# Patient Record
Sex: FEMALE | Race: BLACK OR AFRICAN AMERICAN
[De-identification: names, ages, dates, MRNs, and addresses within clinical notes are randomized per-mention and may not be internally consistent; named-entity substitution may affect disease eponyms.]

---

## 2017-08-21 NOTE — RAD
INDICATION:  Right elbow injury.



TECHNIQUE: 2 views of the right elbow were obtained.



FINDINGS:  The bones are in normal alignment. No joint effusion or fracture is seen. Joint

spaces appear maintained.



There is calcification adjacent to the medial epicondyle suggestive of calcific

tendinitis.



IMPRESSION:   NO EVIDENCE FOR FRACTURE.

## 2017-08-21 NOTE — RAD
INDICATION:  Right shoulder injury.



TECHNIQUE: 4 views of the right shoulder were obtained.



FINDINGS:  The bones are in normal alignment. No fracture is seen.  There is calcification

adjacent to the anterior lateral aspect of the humeral head suggestive of calcific

tendinitis. Joint spaces appear maintained.



IMPRESSION:  NO EVIDENCE OF FRACTURE.

## 2017-08-21 NOTE — UC
Upper Extremity HPI





- HPI Summary


HPI Summary: 





This is a 53 yo female with a h/o DM, HTN and anxiety who presented with c/o R 

elbow and shoulder pain.  She was involved in an event at work ~5 days ago 

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  She did not 

experience any immediate pain, but noticed increased pain and stiffness over 

the last several days.  She has trialed APAP, ice and immobilization at home 

without relief.  No weakness or numbness.





- History of Current Complaint


Chief Complaint: UCUpperExtremity


Stated Complaint: RIGHT ARM PAIN/LIMITED ROM


Hx Last Menstrual Period: JULY 14, 2017





- Allergies/Home Medications


Allergies/Adverse Reactions: 


 Allergies











Allergy/AdvReac Type Severity Reaction Status Date / Time


 


No Known Allergies Allergy   Verified 08/21/17 19:56











Home Medications: 


 Home Medications





Acetaminophen [Acetaminophen Extra Stren] 1,000 mg PO Q6H PRN 08/21/17 [History 

Confirmed 08/21/17]


Sitagliptin Phosphate [Januvia] 25 mg PO DAILY 08/21/17 [History Confirmed 08/21 /17]











PMH/Surg Hx/FS Hx/Imm Hx


Endocrine History: Diabetes


Cardiovascular History: Hypertension


Psychological History: Anxiety





- Surgical History


Surgical History: Yes


Surgery Procedure, Year, and Place: Lumpectomy Right Breast; Tubal Ligation





- Family History


Known Family History: Positive: Blood Disorder - sister had DVT





- Social History


Alcohol Use: Rare


Substance Use Type: None


Smoking Status (MU): Light Every Day Tobacco Smoker


Type: Cigarettes


Amount Used/How Often: 1/2 pack per day


Length of Time of Smoking/Using Tobacco: 25 YRS


Have You Smoked in the Last Year: Yes





Review of Systems


Constitutional: Negative


Skin: Negative


Eyes: Negative


ENT: Negative


Respiratory: Negative


Cardiovascular: Negative


Gastrointestinal: Negative


Genitourinary: Negative


Motor: Negative


Neurovascular: Negative


Musculoskeletal: Arthralgia, Decreased ROM


Neurological: Negative


Psychological: Negative


All Other Systems Reviewed And Are Negative: Yes





Physical Exam


Triage Information Reviewed: Yes


Appearance: Pain Distress - mild


Vital Signs: 


 Initial Vital Signs











Temp  98.1 F   08/21/17 19:48


 


Pulse  90   08/21/17 19:48


 


Resp  18   08/21/17 19:48


 


BP  125/64   08/21/17 19:48


 


Pulse Ox  97   08/21/17 19:48











Vital Signs Reviewed: Yes


Neck: Positive: Nontender, No Lymphadenopathy


Respiratory: Positive: Chest non-tender, Normal breath sounds.  Negative: 

Crackles, Rhonchi, Wheezing


Cardiovascular: Positive: RRR, No Murmur


Musculoskeletal: Positive: Strength Intact, ROM Limited @ - R shoulder, limited 

by pain, Other: - TTP over anterior humeral head and mild medial epicondyle TTP


Skin Exam: Normal


Skin: Negative: rashes





Diagnostics





- Laboratory


Diagnostic Studies Completed/Ordered: XR R elbow - no fx.  XR R shoulder - no fx





Upper Extremity Course/Dx





- Course


Course Of Treatment: This is a 53 yo female with DM and HTN who presents with R 

shoulder and elbow pain after an injury 5d ago.  No fx on XR.  Likely due to 

muscle strain.  Recommend pain/spasm relief with oral analgesics, muscle 

relaxants and heat.





- Differential Dx/Diagnosis


Differential Diagnosis/HQI/PQRI: Arthritis, Bursitis, Contusion, Strain, Sprain


Provider Diagnoses: 1. Acute muscle strain of R elbow and shoulder





Discharge





- Discharge Plan


Condition: Stable


Disposition: HOME


Prescriptions: 


Baclofen TAB* [Lioresal TAB*] 5 mg PO TID PRN #30 tab


 PRN Reason: muscle pain/spasm


Hydrocodone-Acetaminophen [Norco 5-325 mg] 1 - 2 tab PO Q6H PRN #30 tab MDD 8 

tabs


 PRN Reason: Pain


Patient Education Materials:  Muscle Strain (ED), Tendinitis (ED)


Forms:  *Work Release


Referrals: 


Non Staff,Doctor [Primary Care Provider] - 


Additional Instructions: 


Activity: As tolerated





Instructions:


1. Use pain and muscle relaxant medications as needed


2. Apply heat for pain relief or soaking in a warm bath tub


3. Avoid aggravating activity

## 2017-08-24 NOTE — UC
Shoulder Pain HPI





- HPI Summary


HPI Summary: 





Patient was ssen for a work injury on 8/21/17 and treated with  muscle relaxors 

and pain meds. She is having increased pain and decreased ROM in the right 

shoulder.





- History of Current Complaint


Chief Complaint: UCUpperExtremity


Stated Complaint: RIGHT ARM/ELBOW INJURY WC


Time Seen by Provider: 08/24/17 16:10


Hx Obtained From: Patient


Hx Last Menstrual Period: july 2017


Pregnant?: No


Onset/Duration: Sudden Onset, Lasting Weeks


Timing: Weeks


Severity Initially: Moderate


Severity Currently: Severe


Aggravating Factor(s): Movement, Lifting, Flexion, Extension - of elow and 

shoulder


Alleviating Factor(s): Nothing


Related History: Dominant Hand Right





- Allergies/Home Medications


Allergies/Adverse Reactions: 


 Allergies











Allergy/AdvReac Type Severity Reaction Status Date / Time


 


No Known Allergies Allergy   Verified 08/24/17 15:59














PMH/Surg Hx/FS Hx/Imm Hx


Previously Healthy: Yes





- Surgical History


Surgical History: Yes


Surgery Procedure, Year, and Place: Lumpectomy Right Breast; Tubal Ligation





- Family History


Known Family History: Positive: Blood Disorder - sister had DVT





- Social History


Alcohol Use: Rare


Substance Use Type: None


Smoking Status (MU): Light Every Day Tobacco Smoker


Type: Cigarettes


Amount Used/How Often: 1/2 pack per day


Length of Time of Smoking/Using Tobacco: 25 YRS


Have You Smoked in the Last Year: Yes





Review of Systems


Constitutional: Negative


Skin: Negative


Eyes: Negative


ENT: Negative


Respiratory: Negative


Cardiovascular: Negative


Gastrointestinal: Negative


Genitourinary: Negative


Motor: Negative


Neurovascular: Negative


Musculoskeletal: Arthralgia, Decreased ROM, Myalgia


Neurological: Negative


Psychological: Negative


All Other Systems Reviewed And Are Negative: Yes





Physical Exam


Triage Information Reviewed: Yes


Appearance: Well-Appearing, Well-Nourished, Pain Distress


Vital Signs: 


 Initial Vital Signs











Temp  98.2 F   08/24/17 15:54


 


Pulse  73   08/24/17 15:54


 


Resp  16   08/24/17 15:54


 


BP  132/67   08/24/17 15:54


 


Pulse Ox  99   08/24/17 15:54











Vital Signs Reviewed: Yes


Eye Exam: Normal


ENT Exam: Normal


ENT: Positive: Normal ENT inspection


Dental Exam: Normal


Neck exam: Normal


Neck: Positive: Supple, Nontender, No Lymphadenopathy


Respiratory: Positive: Chest non-tender, Lungs clear, Normal breath sounds


Cardiovascular Exam: Normal


Cardiovascular: Positive: RRR, No Murmur


Abdominal Exam: Normal


Abdomen Description: Positive: Nontender, No Organomegaly, Soft


Bowel Sounds: Positive: Present


Musculoskeletal: Positive: Strength Limited @ - in right arm, ROM Limited @ - 

in shoulder  AROM EXT, FLX, ABD, ADD, INT/EXT rot, PROM painful in shoulder, 

painfree in ELbow


Neurological Exam: Normal


Neurological: Positive: Alert, Muscle Tone Normal


Psychological Exam: Normal


Skin Exam: Normal





Shoulder Course/Dx





- Course


Course Of Treatment: hx obtained,exam performed ,meds reviewed, toradol given, 

recommend follow up with Dr Garcia for further evaluation





- Differential Dx/Diagnosis


Differential Diagnosis/HQI/PQRI: Fracture (Closed), Rotator Cuff Injury, Sprain

, Strain, Tendonitis


Provider Diagnoses: shoulder injury





Discharge





- Discharge Plan


Condition: Stable


Disposition: HOME


Prescriptions: 


Ketorolac TAB * [Toradol TAB *] 10 mg PO BID #4 tab


Patient Education Materials:  Shoulder Pain (ED), Tendinitis (ED)


Referrals: 


Ranjan Garcia MD [Medical Doctor] - 


Non Staff,Doctor [Primary Care Provider] - 


Additional Instructions: 


1. You received a toradol shot today, do not take any Ibuprofen for the next 8 

hours, 


2. Follow up with Dr garcia for further evaluation of soft tissue injury to the 

right shoulder


3. Use your sling as needed, Heat, and rest.

## 2019-09-24 ENCOUNTER — HOSPITAL ENCOUNTER (EMERGENCY)
Dept: HOSPITAL 25 - UCCORT | Age: 54
Discharge: HOME | End: 2019-09-24
Payer: SELF-PAY

## 2019-09-24 VITALS — DIASTOLIC BLOOD PRESSURE: 62 MMHG | SYSTOLIC BLOOD PRESSURE: 133 MMHG
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
## 2019-09-24 DIAGNOSIS — Z23: ICD-10-CM

## 2019-09-24 DIAGNOSIS — E11.9: ICD-10-CM

## 2019-09-24 DIAGNOSIS — S61.032A: Primary | ICD-10-CM
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
## 2019-09-24 DIAGNOSIS — I10: ICD-10-CM

## 2019-09-24 DIAGNOSIS — Z88.8: ICD-10-CM

## 2019-09-24 DIAGNOSIS — F17.210: ICD-10-CM
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
## 2019-09-24 PROCEDURE — 87340 HEPATITIS B SURFACE AG IA: CPT

## 2019-09-24 PROCEDURE — 36415 COLL VENOUS BLD VENIPUNCTURE: CPT

## 2019-09-24 PROCEDURE — G0463 HOSPITAL OUTPT CLINIC VISIT: HCPCS

## 2019-09-24 PROCEDURE — 90715 TDAP VACCINE 7 YRS/> IM: CPT

## 2019-09-24 PROCEDURE — 87389 HIV-1 AG W/HIV-1&-2 AB AG IA: CPT

## 2019-09-24 PROCEDURE — 86706 HEP B SURFACE ANTIBODY: CPT

## 2019-09-24 PROCEDURE — 86803 HEPATITIS C AB TEST: CPT

## 2019-09-24 PROCEDURE — 90471 IMMUNIZATION ADMIN: CPT

## 2019-09-24 PROCEDURE — 99211 OFF/OP EST MAY X REQ PHY/QHP: CPT

## 2019-09-24 NOTE — UC
Skin Complaint HPI





- HPI Summary


HPI Summary: 





needle stick to her left thumb at work 


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


pt. has no hx of HIV, Hep B or Hep C


the source has no hx of HIV/ Hep c / Hep B 


the source is being monitored and tested 








- History of Current Complaint


Chief Complaint: UCSkin


Time Seen by Provider: 09/24/19 11:40


Stated Complaint: NEEDLE POKE


Hx Obtained From: Patient


Hx Last Menstrual Period: post menapausal


Onset/Duration: Sudden Onset, Lasting Hours - 2, Still Present


Timing: Constant


Onset Severity: Mild


Current Severity: Mild


Pain Intensity: 0


Location: Hand (Left) - left thumb


Aggravating Factor(s): Nothing


Alleviating Factor(s): Nothing


Associated Signs & Symptoms: Positive: Negative


Related History: Other: - needle stick





- Allergy/Home Medications


Allergies/Adverse Reactions: 


 Allergies











Allergy/AdvReac Type Severity Reaction Status Date / Time


 


canagliflozin [From Invokana] Allergy  Rash Verified 09/24/19 11:49














PMH/Surg Hx/FS Hx/Imm Hx


Endocrine History: Diabetes


Cardiovascular History: Hypertension





- Surgical History


Surgical History: Yes


Surgery Procedure, Year, and Place: Lumpectomy Right Breast; Tubal Ligation





- Family History


Known Family History: Positive: Diabetes, Blood Disorder - sister had DVT





- Social History


Alcohol Use: Rare


Substance Use Type: None


Smoking Status (MU): Light Every Day Tobacco Smoker


Type: Cigarettes


Amount Used/How Often: 1/2 pack per day


Length of Time of Smoking/Using Tobacco: 25 YRS


Have You Smoked in the Last Year: Yes





Review of Systems


All Other Systems Reviewed And Are Negative: Yes


Constitutional: Positive: Negative


Eyes: Positive: Negative


ENT: Positive: Negative


Is Patient Immunocompromised?: No





Physical Exam


Triage Information Reviewed: Yes


Appearance: Well-Appearing, No Pain Distress, Well-Nourished


Vital Signs: 


 Initial Vital Signs











Temp  98.1 F   09/24/19 11:43


 


Pulse  72   09/24/19 11:43


 


Resp  16   09/24/19 11:43


 


BP  133/62   09/24/19 11:43


 


Pulse Ox  98   09/24/19 11:43











Vital Signs Reviewed: Yes


Eye Exam: Normal


Eyes: Positive: Conjunctiva Clear


ENT: Positive: Normal ENT inspection, Hearing grossly normal, Pharynx normal


Neck exam: Normal


Neck: Positive: Supple, Nontender


Respiratory Exam: Normal


Respiratory: Positive: Chest non-tender, Lungs clear, Normal breath sounds


Cardiovascular: Positive: RRR, No Murmur, Pulses Normal


Skin: Positive: Other - small puncture wound left thumb , no bleeding





Course/Dx





- Diagnoses


Provider Diagnosis: 


 Needle stick injury








Discharge ED





- Sign-Out/Discharge


Documenting (check all that apply): Patient Departure


All imaging exams completed and their final reports reviewed: No Studies





- Discharge Plan


Condition: Stable


Disposition: HOME


Patient Education Materials:  Needle Stick Injuries (ED)


Referrals: 


No Primary Care Phys,NOPCP [Primary Care Provider] - 3 Days


Additional Instructions: 


will check for HIV, Hep B and Hep C 


TDAP was given 


the source is being tested , no need for HIV meds at this time 





- Billing Disposition and Condition


Condition: STABLE


Disposition: Home

## 2019-09-26 NOTE — UC
- Progress Note


Progress Note: 





hep C neg


 hep B nonreactive ag


Hep B surface Ab immune





please call pt wt results - supportive of hep B vaccination immunity


no change


ayo 





Course/Dx





- Diagnoses


Provider Diagnoses: 


 Needle stick injury








Discharge ED





- Sign-Out/Discharge


Documenting (check all that apply): Post-Discharge Follow Up


All imaging exams completed and their final reports reviewed: No Studies





- Discharge Plan


Condition: Stable


Disposition: HOME


Patient Education Materials:  Needle Stick Injuries (ED)


Referrals: 


No Primary Care Phys,NOPCP [Primary Care Provider] - 3 Days


Additional Instructions: 


will check for HIV, Hep B and Hep C 


TDAP was given 


the source is being tested , no need for HIV meds at this time 





- Billing Disposition and Condition


Condition: STABLE


Disposition: Home